# Patient Record
Sex: FEMALE | Race: WHITE | Employment: FULL TIME | URBAN - METROPOLITAN AREA
[De-identification: names, ages, dates, MRNs, and addresses within clinical notes are randomized per-mention and may not be internally consistent; named-entity substitution may affect disease eponyms.]

---

## 2021-09-14 PROBLEM — Z01.419 WELL FEMALE EXAM WITH ROUTINE GYNECOLOGICAL EXAM: Status: ACTIVE | Noted: 2021-09-14

## 2021-09-14 PROBLEM — Z00.00 ANNUAL PHYSICAL EXAM: Status: ACTIVE | Noted: 2021-09-14

## 2021-09-14 NOTE — PROGRESS NOTES
Subjective:     Patient ID: Lilia Leventhal is a 27year old female. Patient presents today for annual physical otherwise has no specific complaints. History/Other:   Review of Systems   Constitutional: Negative. HENT: Negative.     Eyes: Negative Conjunctiva/sclera: Conjunctivae normal.      Pupils: Pupils are equal, round, and reactive to light. Neck:      Vascular: No JVD. Cardiovascular:      Rate and Rhythm: Normal rate and regular rhythm. Pulses: Normal pulses.       Heart sounds: [facility] & Referrals:  [facility] - INTERNAL

## 2021-09-19 ENCOUNTER — TELEPHONE (OUTPATIENT)
Dept: INTERNAL MEDICINE CLINIC | Facility: CLINIC | Age: 31
End: 2021-09-19

## 2021-09-19 DIAGNOSIS — D64.9 NORMOCYTIC ANEMIA: Primary | ICD-10-CM

## 2021-09-20 NOTE — TELEPHONE ENCOUNTER
----- Message from [facility] sent at 9/20/2021  9:35 AM CDT -----  Regarding: Question regarding LIPID PANEL  I was wondering if you could tell based off my blood work if I am pregnant?  This past weekend I have been feeling nauseated in the mornings a

## 2021-09-20 NOTE — TELEPHONE ENCOUNTER
Action Requested: Summary for Provider     []  Critical Lab, Recommendations Needed  [] Need Additional Advice  []   FYI    []   Need Orders  [] Need Medications Sent to Pharmacy  []  Other     SUMMARY: Verified name and .   Patient states that she belie

## 2021-09-20 NOTE — TELEPHONE ENCOUNTER
From: Dorene Escalante  To: Bari Lopez MD  Sent: 9/20/2021 9:35 AM CDT  Subject: Question regarding LIPID PANEL    I was wondering if you could tell based off my blood work if I am pregnant?  This past weekend I have been feeling nauseated in the mo

## 2021-09-20 NOTE — PROGRESS NOTES
Video Check-In    Eulas Cassette verbally consents to a Virtual/Telephone Check-In visit on 09/20/21. Patient understands and accepts financial responsibility for any deductible, co-insurance and/or co-pays associated with this service.     Mallorie PARKER APR tried nothing for the symptoms. The treatment provided no relief.        Review of Systems:    General: Denies chills/fever, night sweats, weight loss, loss of appetite  Neurological: Denies frequent headaches  Cardiovascular: Denies leg swelling, chest evie current covid situation. Please note that the following visit was completed using a 2 way real-time interactive audio and/or video communication.   This has been done in good javid to provide continuity of care in the best interest of the patient provid

## 2021-09-22 NOTE — TELEPHONE ENCOUNTER
Patient calling stating her LMP was back in August. And positive pregnancy test at home.  Patient is thinking about termination     Please advise

## 2021-09-22 NOTE — TELEPHONE ENCOUNTER
Pt calling states LMP around 8/18, pt states she had +UPT. Pt states she would like to terminate pregnancy. Pt informed that our facility does not do terminations.  Recommendations made that pt reach out to insurance company to see which facilities might be

## 2021-10-23 NOTE — TELEPHONE ENCOUNTER
See lab 9/21/21-per Lisabeth Schirmer 's note, plan to terminate pregnancy, please call and assess.         From: Willi Macias  To: Fadumo Vaughan MD  Sent: 10/22/2021  2:42 PM CDT  Subject: Question regarding CBC WITH DIFFERENTIAL WITH PLATELET    Also s

## 2021-10-23 NOTE — TELEPHONE ENCOUNTER
See acute encounter 10/23/21. From: Sim Herrera  To: Terry Cervantes MD  Sent: 10/22/2021  2:42 PM CDT  Subject: Question regarding CBC WITH DIFFERENTIAL WITH PLATELET    Also should I be concerned of my iron being low?  I have been feelin

## 2021-10-23 NOTE — TELEPHONE ENCOUNTER
Her hemoglobin was only very slightly decreased and she was also pregnant at that time. However since she is now having sysmptoms, will repeat cbc and do iron studies. Order in epic.

## 2021-10-25 NOTE — TELEPHONE ENCOUNTER
Patient was advised of 's notes below and verbalized understanding. Her hemoglobin was only very slightly decreased and she was also pregnant at that time.  However since she is now having sysmptoms, will repeat cbc and do iron stud

## 2021-11-12 PROBLEM — Z01.419 WELL FEMALE EXAM WITH ROUTINE GYNECOLOGICAL EXAM: Status: RESOLVED | Noted: 2021-09-14 | Resolved: 2021-11-12

## 2021-11-12 NOTE — PATIENT INSTRUCTIONS
Start nuvaring on first day of period (flow, not just spotting). Set alarm on phone that day as \"RING IN\" & repeat every 4 weeks. Set alarm 3 weeks later as \"RING OUT\" & repeat every 4 weeks.   Therefore overall, you will be using nuvaring for 21 days i

## 2021-11-12 NOTE — PROGRESS NOTES
Rhiannon Atkinson is a 27year old female [id number] Patient's last menstrual period was 10/28/2021.  Patient presents with:  Gyn Exam: Annual exam --  nwe pt  Std Screen: wishes for full screen  Contraception: Did not like Mirena IUD due to xs cramps on period palpitations  Respiratory:    denies shortness of breath  Gastrointestinal:  denies severe abdominal pain, frequent diarrhea or constipation, nausea / vomiting  Genitourinary:    denies dysuria, bothersome incontinence  Skin/Breast:   denies any breast evie STD (sexually transmitted disease)  -     HCV ANTIBODY  -     HEPATITIS B SURFACE ANTIGEN  -     HIV AG AB COMBO  -     T PALLIDUM SCREENING CASCADE    Birth control counseling    Other orders  -     NUVARING 0.12-0.015 MG/24HR Vaginal Ring; Place 1 ring v

## 2021-11-16 NOTE — TELEPHONE ENCOUNTER
Pt informed of [facility] recs and verbalized understanding. Azithromycin 1g sent to pharmacy. Pt accepted ИРИНА on 12/16.

## 2021-11-20 NOTE — PROGRESS NOTES
Please call pt and inform her of results attached -- will need pap repeated at time of ИРИНА.  CAn use OB slot in 4-6 weeks

## 2021-11-29 NOTE — ED PROVIDER NOTES
Patient Seen in: [facility]      History   Patient presents with:  Covid-19 Test    Stated Complaint: covid test    Subjective:   HPI    This is a well-appearing 26-year-old female who presents for Covid testing.   Her son is here and tested p clear. Uvula midline. Eyes:      General: Lids are normal.      Extraocular Movements: Extraocular movements intact. Conjunctiva/sclera: Conjunctivae normal.      Pupils: Pupils are equal, round, and reactive to light.    Cardiovascular:      Rate an List

## 2021-12-16 ENCOUNTER — TELEPHONE (OUTPATIENT)
Dept: OBGYN CLINIC | Facility: CLINIC | Age: 31
End: 2021-12-16

## 2021-12-16 NOTE — TELEPHONE ENCOUNTER
Pt was unable to wait for appt today and had to reschedule and go back to work. Patient was wondering if there is any way she could be seen sooner. Next avail appt I was able to find for her was not until  03/02/2022.

## 2021-12-16 NOTE — TELEPHONE ENCOUNTER
Patient rescheduled 12/16 appt to next available [facility] on 3/2/22. She was to be seen for DARIO MONROY for +chlamydia and repeat pap. [facility]- would you like to add patient sooner?

## 2022-12-09 NOTE — TELEPHONE ENCOUNTER
----- Message from JORI Smith sent at 12/9/2022  1:26 AM CST -----  Please contact [address] and inform her that it would be best for her to hematocolpos to further evaluate her abnormal Pap smear scheduling

## 2022-12-14 NOTE — TELEPHONE ENCOUNTER
Patient name and  verified. Patient informed of [address] result note and recommendations. Patient scheduled for colp with [facility] on . Aware of appt details. Advised of pre procedure urine sample and can also premedicate 1 hour prior to appt time.

## 2023-03-15 NOTE — TELEPHONE ENCOUNTER
Pt Name and  verified. Patient informed and verbalized understanding. Prior to coming in for your colp, please make sure to premedicate at least 30 minutes before coming in with ibuprofen (600 mg) or tylenol (extra strength 1,000 mg) . Come prepared to provide a urine sample before your procedure and make sure you arrive at least 15 minutes early to your appointment.

## 2023-09-07 NOTE — TELEPHONE ENCOUNTER
Please schedule the following surgery:    Procedure: labial cyst excision    Date: asap    Diagnosis: enlarging labial cyst    Admission:Day surgery    Anesth: general    Preop Medical Clearance needed:  No    PCN allergy:  no antibiotic needed    Additional Orders: No    Additional equipment:  No    Rep needed: No    Additional surgery time needed: No    IPA tubal / hyst form signed: not applicable    Comments / Orders to Nurse: none    Discussed possible complications including but not limited to: Alternatives to surgical intervention discussed with patient in detail.  and infection

## 2023-09-07 NOTE — TELEPHONE ENCOUNTER
Spoke to pt aware surgery is scheduled on Friday,9/15 at 3pm.    Per [facility] no PA Needed    Minor case instructions sent via [email or web address]    Staff message sent to MD to please place pre-op orders    Entered in book and calendar

## 2023-09-08 NOTE — PROGRESS NOTES
Pt advised of results and recs and verbalized understanding. Pt chose flagyl. Advised to avoid etoh during tx.

## 2023-09-15 ENCOUNTER — ANESTHESIA EVENT (OUTPATIENT)
Dept: SURGERY | Facility: HOSPITAL | Age: 33
End: 2023-09-15
Payer: MEDICAID

## 2023-09-15 ENCOUNTER — HOSPITAL ENCOUNTER (OUTPATIENT)
Facility: HOSPITAL | Age: 33
Setting detail: HOSPITAL OUTPATIENT SURGERY
Discharge: HOME | End: 2023-09-15
Attending: OBSTETRICS & GYNECOLOGY | Admitting: OBSTETRICS & GYNECOLOGY
Payer: MEDICAID

## 2023-09-15 ENCOUNTER — ANESTHESIA (OUTPATIENT)
Dept: SURGERY | Facility: HOSPITAL | Age: 33
End: 2023-09-15
Payer: MEDICAID

## 2023-09-15 VITALS
BODY MASS INDEX: 21.97 KG/M2 | SYSTOLIC BLOOD PRESSURE: 114 MMHG | TEMPERATURE: 98 F | HEART RATE: 54 BPM | DIASTOLIC BLOOD PRESSURE: 68 MMHG | RESPIRATION RATE: 16 BRPM | WEIGHT: 116.38 LBS | OXYGEN SATURATION: 100 % | HEIGHT: 61 IN

## 2023-09-15 DIAGNOSIS — N90.7 LABIAL CYST: ICD-10-CM

## 2023-09-15 LAB — B-HCG UR QL: NEGATIVE

## 2023-09-15 PROCEDURE — 0UBM0ZZ EXCISION OF VULVA, OPEN APPROACH: ICD-10-PCS | Performed by: OBSTETRICS & GYNECOLOGY

## 2023-09-15 PROCEDURE — 11424 EXC H-F-NK-SP B9+MARG 3.1-4: CPT | Performed by: OBSTETRICS & GYNECOLOGY

## 2023-09-15 PROCEDURE — 12042 INTMD RPR N-HF/GENIT2.6-7.5: CPT | Performed by: OBSTETRICS & GYNECOLOGY

## 2023-09-15 RX ORDER — ONDANSETRON 2 MG/ML
4 INJECTION INTRAMUSCULAR; INTRAVENOUS EVERY 6 HOURS PRN
Status: DISCONTINUED | OUTPATIENT
Start: 2023-09-15 | End: 2023-09-15

## 2023-09-15 RX ORDER — DEXAMETHASONE SODIUM PHOSPHATE 4 MG/ML
VIAL (ML) INJECTION AS NEEDED
Status: DISCONTINUED | OUTPATIENT
Start: 2023-09-15 | End: 2023-09-15 | Stop reason: SURG

## 2023-09-15 RX ORDER — ACETAMINOPHEN 500 MG
1000 TABLET ORAL ONCE
Status: COMPLETED | OUTPATIENT
Start: 2023-09-15 | End: 2023-09-15

## 2023-09-15 RX ORDER — ACETAMINOPHEN 500 MG
1000 TABLET ORAL ONCE AS NEEDED
Status: DISCONTINUED | OUTPATIENT
Start: 2023-09-15 | End: 2023-09-15

## 2023-09-15 RX ORDER — SODIUM CHLORIDE, SODIUM LACTATE, POTASSIUM CHLORIDE, CALCIUM CHLORIDE 600; 310; 30; 20 MG/100ML; MG/100ML; MG/100ML; MG/100ML
INJECTION, SOLUTION INTRAVENOUS CONTINUOUS
Status: DISCONTINUED | OUTPATIENT
Start: 2023-09-15 | End: 2023-09-15

## 2023-09-15 RX ORDER — HYDROCODONE BITARTRATE AND ACETAMINOPHEN 5; 325 MG/1; MG/1
2 TABLET ORAL ONCE AS NEEDED
Status: DISCONTINUED | OUTPATIENT
Start: 2023-09-15 | End: 2023-09-15

## 2023-09-15 RX ORDER — HYDROMORPHONE HYDROCHLORIDE 1 MG/ML
0.4 INJECTION, SOLUTION INTRAMUSCULAR; INTRAVENOUS; SUBCUTANEOUS EVERY 5 MIN PRN
Status: DISCONTINUED | OUTPATIENT
Start: 2023-09-15 | End: 2023-09-15

## 2023-09-15 RX ORDER — LIDOCAINE HYDROCHLORIDE 10 MG/ML
INJECTION, SOLUTION EPIDURAL; INFILTRATION; INTRACAUDAL; PERINEURAL AS NEEDED
Status: DISCONTINUED | OUTPATIENT
Start: 2023-09-15 | End: 2023-09-15 | Stop reason: SURG

## 2023-09-15 RX ORDER — MORPHINE SULFATE 4 MG/ML
2 INJECTION, SOLUTION INTRAMUSCULAR; INTRAVENOUS EVERY 10 MIN PRN
Status: DISCONTINUED | OUTPATIENT
Start: 2023-09-15 | End: 2023-09-15

## 2023-09-15 RX ORDER — DIPHENHYDRAMINE HYDROCHLORIDE 50 MG/ML
INJECTION INTRAMUSCULAR; INTRAVENOUS AS NEEDED
Status: DISCONTINUED | OUTPATIENT
Start: 2023-09-15 | End: 2023-09-15 | Stop reason: SURG

## 2023-09-15 RX ORDER — HYDROMORPHONE HYDROCHLORIDE 1 MG/ML
0.6 INJECTION, SOLUTION INTRAMUSCULAR; INTRAVENOUS; SUBCUTANEOUS EVERY 5 MIN PRN
Status: DISCONTINUED | OUTPATIENT
Start: 2023-09-15 | End: 2023-09-15

## 2023-09-15 RX ORDER — NALOXONE HYDROCHLORIDE 0.4 MG/ML
80 INJECTION, SOLUTION INTRAMUSCULAR; INTRAVENOUS; SUBCUTANEOUS AS NEEDED
Status: DISCONTINUED | OUTPATIENT
Start: 2023-09-15 | End: 2023-09-15

## 2023-09-15 RX ORDER — ONDANSETRON 2 MG/ML
INJECTION INTRAMUSCULAR; INTRAVENOUS AS NEEDED
Status: DISCONTINUED | OUTPATIENT
Start: 2023-09-15 | End: 2023-09-15 | Stop reason: SURG

## 2023-09-15 RX ORDER — MORPHINE SULFATE 4 MG/ML
4 INJECTION, SOLUTION INTRAMUSCULAR; INTRAVENOUS EVERY 10 MIN PRN
Status: DISCONTINUED | OUTPATIENT
Start: 2023-09-15 | End: 2023-09-15

## 2023-09-15 RX ORDER — KETOROLAC TROMETHAMINE 30 MG/ML
INJECTION, SOLUTION INTRAMUSCULAR; INTRAVENOUS AS NEEDED
Status: DISCONTINUED | OUTPATIENT
Start: 2023-09-15 | End: 2023-09-15 | Stop reason: SURG

## 2023-09-15 RX ORDER — MORPHINE SULFATE 10 MG/ML
6 INJECTION, SOLUTION INTRAMUSCULAR; INTRAVENOUS EVERY 10 MIN PRN
Status: DISCONTINUED | OUTPATIENT
Start: 2023-09-15 | End: 2023-09-15

## 2023-09-15 RX ORDER — HYDROMORPHONE HYDROCHLORIDE 1 MG/ML
0.2 INJECTION, SOLUTION INTRAMUSCULAR; INTRAVENOUS; SUBCUTANEOUS EVERY 5 MIN PRN
Status: DISCONTINUED | OUTPATIENT
Start: 2023-09-15 | End: 2023-09-15

## 2023-09-15 RX ORDER — HYDROCODONE BITARTRATE AND ACETAMINOPHEN 5; 325 MG/1; MG/1
1 TABLET ORAL ONCE AS NEEDED
Status: DISCONTINUED | OUTPATIENT
Start: 2023-09-15 | End: 2023-09-15

## 2023-09-15 RX ORDER — PROCHLORPERAZINE EDISYLATE 5 MG/ML
5 INJECTION INTRAMUSCULAR; INTRAVENOUS EVERY 8 HOURS PRN
Status: DISCONTINUED | OUTPATIENT
Start: 2023-09-15 | End: 2023-09-15

## 2023-09-15 RX ADMIN — KETOROLAC TROMETHAMINE 30 MG: 30 INJECTION, SOLUTION INTRAMUSCULAR; INTRAVENOUS at 19:11:00

## 2023-09-15 RX ADMIN — DEXAMETHASONE SODIUM PHOSPHATE 8 MG: 4 MG/ML VIAL (ML) INJECTION at 18:48:00

## 2023-09-15 RX ADMIN — ONDANSETRON 4 MG: 2 INJECTION INTRAMUSCULAR; INTRAVENOUS at 19:11:00

## 2023-09-15 RX ADMIN — LIDOCAINE HYDROCHLORIDE 50 MG: 10 INJECTION, SOLUTION EPIDURAL; INFILTRATION; INTRACAUDAL; PERINEURAL at 18:45:00

## 2023-09-15 RX ADMIN — SODIUM CHLORIDE, SODIUM LACTATE, POTASSIUM CHLORIDE, CALCIUM CHLORIDE: 600; 310; 30; 20 INJECTION, SOLUTION INTRAVENOUS at 19:19:00

## 2023-09-15 RX ADMIN — DIPHENHYDRAMINE HYDROCHLORIDE 12.5 MG: 50 INJECTION INTRAMUSCULAR; INTRAVENOUS at 18:48:00

## 2023-09-28 ENCOUNTER — TELEPHONE (OUTPATIENT)
Dept: OBGYN CLINIC | Facility: CLINIC | Age: 33
End: 2023-09-28

## 2023-09-28 NOTE — TELEPHONE ENCOUNTER
Pt scheduled for colpo on 10/12/23 when [facility] is on-call. Pt is aware that [address] on-call and appt time may be moved depending on [facility] schedule. Advised pt of need for HCG qual the day before procedure and recs for Ibuprofen 30-60 mins before procedure. Pt verbalized understanding.

## 2023-09-30 ENCOUNTER — TELEPHONE (OUTPATIENT)
Dept: OBGYN CLINIC | Facility: CLINIC | Age: 33
End: 2023-09-30

## 2023-09-30 DIAGNOSIS — B37.9 YEAST DETECTED: Primary | ICD-10-CM

## 2023-09-30 RX ORDER — FLUCONAZOLE 150 MG/1
150 TABLET ORAL ONCE
Qty: 1 TABLET | Refills: 0 | Status: SHIPPED | OUTPATIENT
Start: 2023-09-30 | End: 2023-09-30

## 2023-09-30 NOTE — TELEPHONE ENCOUNTER
Pt called, left detailed message on private VM that medication sent and if worsening s/s or no relief by Monday to call office back.

## 2023-09-30 NOTE — TELEPHONE ENCOUNTER
Pt calling indicated that s/s started today, this morning. Cottage cheese like discharge, burning and itching. Pt recently finished Flagyl. Indicates she also had surgery on 9/15 for labial cyst, is applying neosporin to the area to keep moist and wearing pads. Pt informed of Monistat 3 or 7 day. Pt asking for PO medication since she is not comfortable with placing anything vaginally now since surgery. Pt informed will route to [facility] for review. To [facility] to please review and advise. Thank you.

## 2025-05-02 ENCOUNTER — TELEPHONE (OUTPATIENT)
Dept: OBGYN CLINIC | Facility: CLINIC | Age: 35
End: 2025-05-02

## 2025-05-02 NOTE — TELEPHONE ENCOUNTER
Patient calling with +home pregnancy test yesterday.  LMP was 3/19/25. Cycles are regular, 28d. Today she is dated at 6w2d.  Patient agrees to policy to rotate care between all providers in the office, and understands that on-call provider will deliver her.   Patient directed to start PNV with iron, DHA and folic acid.     HT 5'1\"  #    [facility] appt scheduled on 5/10/25 at 11am.     
Patient wants to be seen to establish pregnancy  
No anesthesia complications.

## 2025-05-10 ENCOUNTER — NURSE ONLY (OUTPATIENT)
Dept: OBGYN CLINIC | Facility: CLINIC | Age: 35
End: 2025-05-10
Payer: MEDICAID

## 2025-05-10 DIAGNOSIS — Z34.81 ENCOUNTER FOR SUPERVISION OF OTHER NORMAL PREGNANCY IN FIRST TRIMESTER (HCC): Primary | ICD-10-CM

## 2025-05-10 RX ORDER — CHOLECALCIFEROL (VITAMIN D3) 25 MCG
1 TABLET,CHEWABLE ORAL DAILY
COMMUNITY

## 2025-05-10 NOTE — PROGRESS NOTES
Pt had [facility] appt today with no complaints. Patient is dating 7w3d today based on last menstrual period 3/19/25 (has reg 28d cycles). Normal PN labs plus varicella, HCG qual, and 1 hr gtt ordered. Pt advised all labs must be completed and resulted prior to NPN appt. If labs are not completed and resulted the NPN appt will be cancelled. Pt informed again of both male and female providers and the need to rotate PN appt with all providers since OB on-call will be the one that delivers her. Assisted pt with scheduling NPN appt with Dr. Faustin on 5/22. Desires first trimester screening.       Height: 5'1\"  Weight: 113 lb  BMI: 21.3    Partner's name is Crow Conti contact #[phone]; race: -American  Occupation: Customer Service    MEDICAL HISTORY    Anemia Yes Reports she was taking prenatal vitamins and iron during 1st pregnancy.    Anesthetic complications No    Anxiety/Depression  No    Autoimmune Disorder No    Asthma  No    Cancer No    Diabetes  No    Gyne/breast Surgery No    Heart Disease No    Hepatitis/Liver Disease  No    History of blood transfusion No    History of abnormal pap No    Hypertension  No    Infertility  No    Kidney Disease/Frequent UTIs  No    Medication Allergies No    Latex Allergies No    Food Allergies  No    Neurological Disorder/Epilepsy No    Operations/Hospitalizations Yes Patient had a vaginal cyst removal last year   TB exposure  No    Thyroid Dysfunction No    Trauma/Violence  No    Uterine Anomaly  No    Uterine Fibroids  No    Variocosities/DVTs No    Smoker No Quit in 2014   Drug usage in prior year Yes Occasional marijuana use, prior to pregnancy   Alcohol No Occasionally, prior to pregnancy   Would you accept a blood transfusion? If no, are you a Confucianist? Yes            INFECTION HISTORY    Chlamydia No    Pt or partner have hx of Genital Herpes No    Gonorrhea No    Hepatitis B No    HIV No    HPV No    MRSA No    Syphilis No    Tattoos Yes 3 tattoos    Live  with someone or Exposed to TB No    Rash or viral illness since LMP  No    Varicella Yes In childhood   Pets Yes 1 dog       GENETICS SCREENING    Genetic Screening    Genetic Screening/Teratology Counseling- Includes patient, baby's father, or anyone in either family with:  Patient's age 35 years or older as of estimated date of delivery: Yes     Thalassemia (Italian, Greek, Mediterranean, or  background): MCV less than 80: No     Neural tube defect (Meningomyelocele, Spina bifida, or Anencephaly): No     Congenital heart defect: No     Down syndrome: No     Simone-Sachs (Ashkenazi Advent, Cajun, Kinyarwanda Bryan): No     Canavan disease (Ashkenazi Advent): No     Familial dysautonomia (Ashkenazi Advent): No     Sickle cell disease or trait (): Yes (Comment: pt's cousin has sickle cell trait)     Hemophilia or other blood disorders: No     Muscular dystrophy: No    Cystic fibrosis: No     Mora's chorea: No     Intellectual disability and/or autism: No     Other inherited genetic or chromosomal disorder: No     Maternal metabolic disorder (eg. Type 1 diabetes, PKU): No     Patient or baby's father had child with birth defects not listed above: No     Recurrent pregnancy loss, or a stillbirth: No     Medications (including supplements, vitamins, herbs, or OTC drugs)/illicit/recreational drugs/alcohol since last menstrual period: Yes     If yes, agent(s) and strength/dosage: Daily PNV               MISC    Infant vaccinations  Yes      Pt. Has answered NO 5P questions and has NO  risk factors.    Pt. Given What pregnant women need to know handout.

## 2025-05-20 ENCOUNTER — PATIENT MESSAGE (OUTPATIENT)
Dept: ADMINISTRATIVE | Age: 35
End: 2025-05-20

## (undated) DEVICE — GAMMEX® PI HYBRID SIZE 6.5, STERILE POWDER-FREE SURGICAL GLOVE, POLYISOPRENE AND NEOPRENE BLEND: Brand: GAMMEX

## (undated) DEVICE — D AND C PACK: Brand: MEDLINE INDUSTRIES, INC.

## (undated) DEVICE — COUNT NDL 10 CT HLD 20 FOAM

## (undated) DEVICE — MEDI-VAC NON-CONDUCTIVE SUCTION TUBING: Brand: CARDINAL HEALTH

## (undated) DEVICE — MEGADYNE ELECTRODE ADULT PT RT

## (undated) DEVICE — NDLCTR: FOAM/ADHESIVE 10CT 96/CS: Brand: MEDICAL ACTION INDUSTRIES

## (undated) DEVICE — STIRRUP STRAP W/SLING RING

## (undated) DEVICE — UNDYED BRAIDED (POLYGLACTIN 910), SYNTHETIC ABSORBABLE SUTURE: Brand: COATED VICRYL

## (undated) DEVICE — SOL NACL IRRIG 0.9% 1000ML BTL

## (undated) DEVICE — STERILE SURGICAL LUBRICANT, METAL TUBE: Brand: SURGILUBE

## (undated) DEVICE — POOLE SUCTION INSTRUMENT WITH REMOVABLE SHEATH: Brand: POOLE

## (undated) NOTE — MR AVS SNAPSHOT
After Visit Summary   11/12/2021    Darryle May   MRN: [id number]           Visit Information     Date & Time  11/12/2021 10:20 AM Provider  Chace Sylvester MD [address] [place] [address], [address] [place] [address],[address], [facility].  Phone  3 & repeat every 4 weeks. Set alarm 3 weeks later as \"RING OUT\" & repeat every 4 weeks. Therefore overall, you will be using nuvaring for 21 days in & 7 days out.                     Did you know that [facility] primary care physicians now offer Video Visits thro

## (undated) NOTE — LETTER
[facility]  [facility] [address]  [phone]     Patient: Subha You   YOB: 1990   Date of Visit: 11/29/2021     Dear Employer,        November 29, 2021    At [facility], we are taking special pr discontinue isolation and other precautions 10 days after the date of their first positive RT-PCR test for SARS-CoV-2 RNA.     Persons who are asymptomatic but have been exposed, CDC recommends 14 days of quarantine after exposure based on the time it takes

## (undated) NOTE — LETTER
MINOR CASE LETTER      9/7/2023        Dear Richmond Lind are having a Labial Cyst Excision on Friday,09/15/2023 at 3:00pm at [facility].    Do not eat or drink anything (including water) after midnight the night before surgery. If your procedure is scheduled later in the day, then nothing by mouth for 6 hours before arrival to the hospital.    Satish Saucedo are to call this office if you have any cold or flu symptoms 2 days before your scheduled surgery. Please avoid ALL aspirin and herbal supplements 7 days before surgery. Avoid Ibuprofen, Motrin, Aleve, or Naprosyn for 3 days before surgery. Please avoid ALL Cannabis use 24 hours prior to surgery. You cannot wear hair pins,wigs,artificial nail or metallic nails/ nail polish for surgery. You will be contacted by PreAdmission Testing (PAT) usually within the week before surgery. They will take a short medical history and let you know if any preoperative testing is needed. If you have any questions for preadmission testing please feel free to contact them by calling [phone]. I was advised by your insurance that no prior authorization us needed for surgery. Please make arrangements for someone to drive you home after your surgery. Call our office now to schedule your post-operative appointment for 2 weeks after surgery. Please feel free to contact our office at [phone] if you have any questions regarding these instructions or your procedure.       Sincerely,          Pascual Hills MD  [address]  [phone]    Document electronically generated by:  Frederick Krishna